# Patient Record
Sex: MALE | Race: WHITE | NOT HISPANIC OR LATINO | Employment: FULL TIME | URBAN - METROPOLITAN AREA
[De-identification: names, ages, dates, MRNs, and addresses within clinical notes are randomized per-mention and may not be internally consistent; named-entity substitution may affect disease eponyms.]

---

## 2022-03-28 ENCOUNTER — APPOINTMENT (OUTPATIENT)
Dept: RADIOLOGY | Facility: HOSPITAL | Age: 27
End: 2022-03-28
Attending: EMERGENCY MEDICINE

## 2022-03-28 ENCOUNTER — HOSPITAL ENCOUNTER (EMERGENCY)
Facility: HOSPITAL | Age: 27
Discharge: HOME | End: 2022-03-28
Attending: EMERGENCY MEDICINE | Admitting: EMERGENCY MEDICINE

## 2022-03-28 VITALS
DIASTOLIC BLOOD PRESSURE: 85 MMHG | HEART RATE: 106 BPM | TEMPERATURE: 98.1 F | RESPIRATION RATE: 20 BRPM | SYSTOLIC BLOOD PRESSURE: 144 MMHG | WEIGHT: 315 LBS | OXYGEN SATURATION: 96 %

## 2022-03-28 DIAGNOSIS — S61.214A LACERATION OF RIGHT RING FINGER WITHOUT FOREIGN BODY WITHOUT DAMAGE TO NAIL, INITIAL ENCOUNTER: ICD-10-CM

## 2022-03-28 DIAGNOSIS — S61.216A LACERATION OF RIGHT LITTLE FINGER WITHOUT FOREIGN BODY WITHOUT DAMAGE TO NAIL, INITIAL ENCOUNTER: ICD-10-CM
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
## 2022-03-28 PROCEDURE — 12002 RPR S/N/AX/GEN/TRNK2.6-7.5CM: CPT

## 2022-03-28 PROCEDURE — 99283 EMERGENCY DEPT VISIT LOW MDM: CPT

## 2022-03-28 PROCEDURE — 73130 X-RAY EXAM OF HAND: CPT | Mod: RT

## 2022-03-28 ASSESSMENT — ENCOUNTER SYMPTOMS
WEAKNESS: 0
NUMBNESS: 0

## 2022-03-28 NOTE — ED TRIAGE NOTES
Pt had an accident with chain saw he was using and cut into his right 4th and 5th digits. Bleeding controlled in triage.

## 2022-03-28 NOTE — Clinical Note
Yaniv Kilpatrick was seen and treated in our emergency department on 3/28/2022.  He may return to work on 03/29/2022.  Use caution with chainsaw (ideally do not use) while bulky bandage is in place, as your pinky finger cannot bend while this heals for the first few days..      If you have any questions or concerns, please don't hesitate to call.      Renate Padgett MD

## 2022-03-28 NOTE — ED PROVIDER NOTES
Emergency Department Midlevel Supervisory Note     I personally saw the patient and performed a substantive portion of the visit including all aspects of the medical decision making.    ED Course:  2:20 PM  Renate Padgett, resident MD staffed patient with me. I agree with their assessment and plan of management, and I will see the patient.  2:23 PM  I met with the patient to introduce myself, gather additional history, perform my initial exam, and discuss the plan.  Patient with ragged laceration over the dorsal surface of the right little finger MCP.  Superficial laceration proximal ring finger of the same hand.  Good range of motion.  Sensation intact.  We will proceed with routine closure.  X-ray without evidence of foreign body.    Brief HPI:     Yaniv Kilpatrick is a 26 year old male who presents for evaluation of laceration of right hand.Chainsaw bounced and struck the dorsum of his right hand 20 minutes prior to arrival.  No other injuries    I, Emily Flores, am serving as a scribe to document services personally performed by Manny Merida MD, based on my observations and the provider's statements to me.  I, Manny Merida MD, attest that Emily Flores is acting in a scribe capacity, has observed my performance of the services and has documented them in accordance with my direction.     Brief Physical Exam:   Constitutional:  Alert, in no acute distress  EYES: Conjunctivae clear  HENT:  Atraumatic, normocephalic  Musculoskeletal:  No edema. No cyanosis. Range of motion major extremities intact.    Sensation intact.  Patient with regular laceration over the right ring finger MCP dorsal surface.  Superficial injury along the proximal ring finger of the same hand.  Integument: Warm, Dry, No erythema, No rash.   Neurologic:  Alert & oriented, no focal deficits noted  Psych: Normal mood and affect     MDM:  Patient with laceration along the ring finger.  Good range of motion.  Sensation intact.  Some to show  devitalized.  This will be removed and closure performed.  Splinting performed afterwards to avoid stressing of the repair.  Cautioned avoid making fist as this would stretch the wound.    Right hand 2 cm laceration    Labs and Imaging:  Results for orders placed or performed during the hospital encounter of 03/28/22   XR Hand Right 3 Views    Impression    IMPRESSION: Normal joint spaces and alignment. No acute fracture.     I have reviewed the relevant laboratory and radiology studies    Procedures:  I was present for the key portions of this procedure  Manny Merida MD  [facility] EMERGENCY DEPARTMENT  [address] [place] [id number]  [phone]       Manny Merida MD  03/28/22 1528       Manny Merida MD  03/28/22 1528

## 2022-03-28 NOTE — DISCHARGE INSTRUCTIONS
You were seen today in the emergency department for evaluation after injury with a chainsaw. You had 9 stitches placed in your right pinky finger and steri strips placed on your right ring finger.     Keep the bandage and splint on for the next 3-4 days. This will keep you from bending your pinky finger which could open up the stitches.     After 3-4 days, you can remove the bandage and wash the area with clean soapy water (do not rub it, just let the water run over it). Keep it clean and covered and do your best to not bend your pinky finger too much while it heals.     See your doctor in 7-10 days for removal of the stitches.

## 2022-03-28 NOTE — ED PROVIDER NOTES
Emergency Department Encounter     Evaluation Date & Time:   No admission date for patient encounter.    CHIEF COMPLAINT:  Laceration    Triage Note:Pt had an accident with chain saw he was using and cut into his right 4th and 5th digits. Bleeding controlled in triage.     Impression and Plan     ED COURSE & MEDICAL DECISION MAKING:    Patient is an otherwise healthy 26-year-old male presenting with injury to his right hand after chainsaw slipped while he was cutting a branch.     At the conclusion of the encounter I discussed the results of all the tests and the disposition. The questions were answered. The patient or family acknowledged understanding and was agreeable with the care plan.    FINAL IMPRESSION:    ICD-10-CM    1. Laceration of right little finger without foreign body without damage to nail, initial encounter  S61.216A    2. Laceration of right ring finger without foreign body without damage to nail, initial encounter  S61.214A    3. Contact with chainsaw as cause of accidental injury  W29.3XXA      NEW PRESCRIPTIONS STARTED AT TODAY'S ED VISIT:  New Prescriptions    No medications on file     HPI     Yaniv Kilpatrick is an otherwise healthy 26 year old male who presents to this ED for evaluation of injury to his right hand. He was up in a lift about 40 feet in the air using a chainsaw to cut branches off a tree, when a branch kicked back, he attempted to catch the branch so it did not fall on things below him, and the chainsaw fell back and hit the top of his right hand. He came directly to the ED after this injury. He notes a cut to the dorsum of his right hand, no other injuries. He can still move his hand normally, with some pain due to the cut. His last tetanus shot was in 2013.     REVIEW OF SYSTEMS:  Review of Systems   Skin:        Positive for wound to the right fourth and fifth digits.   Neurological: Negative for weakness and numbness.   All other systems reviewed and are  negative.    Medical History     No past medical history on file.    No past surgical history on file.    No family history on file.    Social History     Tobacco Use     Smoking status: Not on file     Smokeless tobacco: Not on file   Substance Use Topics     Alcohol use: Not on file     Drug use: Not on file     No current outpatient medications on file.    Physical Exam     First Vitals:  Patient Vitals for the past 24 hrs:   BP Temp Temp src Pulse Resp SpO2 Weight   03/28/22 1316 (!) 144/85 98.1  F (36.7  C) Oral 106 20 96 % 149.7 kg (330 lb)     PHYSICAL EXAM:   Physical Exam  Constitutional:       General: He is not in acute distress.     Appearance: He is not toxic-appearing.   HENT:      Head: Normocephalic and atraumatic.      Right Ear: External ear normal.      Left Ear: External ear normal.   Eyes:      Extraocular Movements: Extraocular movements intact.      Conjunctiva/sclera: Conjunctivae normal.   Cardiovascular:      Rate and Rhythm: Regular rhythm. Tachycardia present.      Heart sounds: Normal heart sounds. No murmur heard.  Pulmonary:      Effort: Pulmonary effort is normal. No respiratory distress.      Breath sounds: Normal breath sounds.   Skin:     General: Skin is warm and dry.      Capillary Refill: Capillary refill takes less than 2 seconds.      Comments: Right hand with red work glove in place with cut along dorsal MCP area of right fourth and fifth fingers. Just distal to the right MCP of the fifth finger is a jagged laceration approximately 3 cm with shards of devitalized epidermal tissue. On the proximal phalynx of the right fourth finger there are two superficial lacerations approximately 1.5 cm in length. Intrinsic hand movements are intact. Sensation intact to light touch distally. Normal capillary refill.   Neurological:      General: No focal deficit present.      Mental Status: He is alert.   Psychiatric:         Mood and Affect: Mood normal.       Results     LAB:  All  pertinent labs reviewed and interpreted  Labs Ordered and Resulted from Time of ED Arrival to Time of ED Departure - No data to display    RADIOLOGY:  XR Hand Right 3 Views   Final Result   IMPRESSION: Normal joint spaces and alignment. No acute fracture.                [facility]    -Laceration Repair    Date/Time: 3/28/2022 3:22 PM  Performed by: Renate Padgett MD  Authorized by: Manny Merida MD     Risks, benefits and alternatives discussed.      ANESTHESIA (see MAR for exact dosages):     Anesthesia method:  Nerve block    Block location:  Right fifth finger    Block needle gauge:  27 G    Block anesthetic:  Lidocaine 2% WITH epi    Block injection procedure:  Anatomic landmarks identified, introduced needle and incremental injection    Block outcome:  Anesthesia achieved      LACERATION DETAILS     Location:  Finger    Finger location:  R small finger    Length (cm):  3    Depth (mm):  5    REPAIR TYPE:     Repair type:  Simple      EXPLORATION:     Hemostasis achieved with:  Epinephrine    Wound exploration: wound explored through full range of motion and entire depth of wound probed and visualized      Wound extent: no foreign body, no tendon damage, no underlying fracture and no vascular damage      Contaminated: no      TREATMENT:     Area cleansed with:  Betadine and saline    Amount of cleaning:  Standard    Irrigation solution:  Sterile saline    Irrigation method:  Pressure wash    SKIN REPAIR     Repair method:  Sutures    Suture size:  4-0    Suture material:  Nylon    Suture technique:  Simple interrupted    Number of sutures:  9    APPROXIMATION     Approximation:  Close    POST-PROCEDURE DETAILS     Dressing:  Splint for protection and sterile dressing        PROCEDURE    Patient Tolerance:  Patient tolerated the procedure well with no immediate complications    Renate Padgett MD  [facility] Program, PGY-1  Pager #:  [phone]    Patient seen and staffed with Dr. Merida.       Renate Padgett MD  Resident  03/28/22 9668

## 2022-03-28 NOTE — ED PROVIDER NOTES
ED Provider In Triage Note  [facility]  Encounter Date: Mar 28, 2022    Chief Complaint   Patient presents with     Laceration       Brief HPI:   Yaniv Kilpatrick is a 26 year old male presenting to the Emergency Department with a chief complaint of a hand laceration from a chain saw.  The saw bounced and struck the dorsum of his right hand 20 minutes prior to arrival.  No other injuries    Brief Physical Exam:  BP (!) 144/85   Pulse 106   Temp 98.1  F (36.7  C) (Oral)   Resp 20   Wt 149.7 kg (330 lb)   SpO2 96%   General: Non-toxic appearing  HEENT: Atraumatic  Resp: No respiratory distress  Abdomen: Non-peritoneal  Neuro: Alert, oriented, answers questions appropriately  Psych: Behavior appropriate  Musculoskeletal: complex laceration to the dorsum of the right hand, minimal active bleeding.    Plan Initiated in Triage:  Orders Placed This Encounter     XR Hand Right 3 Views           PIT Dispo:   Return to lobby while awaiting workup and ED bed availability    Sergio Varghese MD on 3/28/2022 at 1:15 PM    Patient was evaluated by the Physician in Triage due to a limitation of available rooms in the Emergency Department. A plan of care was discussed based on the information obtained on the initial evaluation and patient was consuled to return back to the Emergency Department lobby after this initial evalutaiton until results were obtained or a room became available in the Emergency Department. Patient was counseled not to leave prior to receiving the results of their workup.      Sergio Varghese MD  03/28/22 7812